# Patient Record
Sex: FEMALE | Race: ASIAN | NOT HISPANIC OR LATINO | URBAN - METROPOLITAN AREA
[De-identification: names, ages, dates, MRNs, and addresses within clinical notes are randomized per-mention and may not be internally consistent; named-entity substitution may affect disease eponyms.]

---

## 2019-08-05 ENCOUNTER — EMERGENCY (EMERGENCY)
Facility: HOSPITAL | Age: 39
LOS: 1 days | Discharge: HOME | End: 2019-08-05
Attending: EMERGENCY MEDICINE | Admitting: EMERGENCY MEDICINE
Payer: SELF-PAY

## 2019-08-05 VITALS
TEMPERATURE: 98 F | SYSTOLIC BLOOD PRESSURE: 116 MMHG | HEART RATE: 74 BPM | RESPIRATION RATE: 18 BRPM | OXYGEN SATURATION: 100 % | WEIGHT: 125 LBS | HEIGHT: 63 IN | DIASTOLIC BLOOD PRESSURE: 74 MMHG

## 2019-08-05 PROCEDURE — 99284 EMERGENCY DEPT VISIT MOD MDM: CPT

## 2019-08-05 PROCEDURE — 99053 MED SERV 10PM-8AM 24 HR FAC: CPT

## 2019-08-06 VITALS
SYSTOLIC BLOOD PRESSURE: 110 MMHG | OXYGEN SATURATION: 98 % | HEART RATE: 70 BPM | RESPIRATION RATE: 16 BRPM | TEMPERATURE: 98 F | DIASTOLIC BLOOD PRESSURE: 70 MMHG

## 2019-08-06 DIAGNOSIS — Z98.891 HISTORY OF UTERINE SCAR FROM PREVIOUS SURGERY: Chronic | ICD-10-CM

## 2019-08-06 LAB
ALBUMIN SERPL ELPH-MCNC: 4.1 G/DL — SIGNIFICANT CHANGE UP (ref 3.3–5)
ALP SERPL-CCNC: 47 U/L — SIGNIFICANT CHANGE UP (ref 40–120)
ALT FLD-CCNC: 14 U/L — SIGNIFICANT CHANGE UP (ref 10–45)
ANION GAP SERPL CALC-SCNC: 10 MMOL/L — SIGNIFICANT CHANGE UP (ref 5–17)
APPEARANCE UR: CLEAR — SIGNIFICANT CHANGE UP
APTT BLD: 34.1 SEC — SIGNIFICANT CHANGE UP (ref 27.5–36.3)
AST SERPL-CCNC: 16 U/L — SIGNIFICANT CHANGE UP (ref 10–40)
BASOPHILS # BLD AUTO: 0.04 K/UL — SIGNIFICANT CHANGE UP (ref 0–0.2)
BASOPHILS NFR BLD AUTO: 0.6 % — SIGNIFICANT CHANGE UP (ref 0–2)
BILIRUB SERPL-MCNC: 0.2 MG/DL — SIGNIFICANT CHANGE UP (ref 0.2–1.2)
BILIRUB UR-MCNC: NEGATIVE — SIGNIFICANT CHANGE UP
BLD GP AB SCN SERPL QL: NEGATIVE — SIGNIFICANT CHANGE UP
BUN SERPL-MCNC: 19 MG/DL — SIGNIFICANT CHANGE UP (ref 7–23)
CALCIUM SERPL-MCNC: 9.1 MG/DL — SIGNIFICANT CHANGE UP (ref 8.4–10.5)
CHLORIDE SERPL-SCNC: 104 MMOL/L — SIGNIFICANT CHANGE UP (ref 96–108)
CO2 SERPL-SCNC: 23 MMOL/L — SIGNIFICANT CHANGE UP (ref 22–31)
COLOR SPEC: YELLOW — SIGNIFICANT CHANGE UP
CREAT SERPL-MCNC: 0.56 MG/DL — SIGNIFICANT CHANGE UP (ref 0.5–1.3)
DIFF PNL FLD: ABNORMAL
EOSINOPHIL # BLD AUTO: 0.22 K/UL — SIGNIFICANT CHANGE UP (ref 0–0.5)
EOSINOPHIL NFR BLD AUTO: 3.3 % — SIGNIFICANT CHANGE UP (ref 0–6)
GLUCOSE SERPL-MCNC: 116 MG/DL — HIGH (ref 70–99)
GLUCOSE UR QL: NEGATIVE — SIGNIFICANT CHANGE UP
HCG SERPL-ACNC: 3581 MIU/ML — HIGH
HCT VFR BLD CALC: 36.2 % — SIGNIFICANT CHANGE UP (ref 34.5–45)
HGB BLD-MCNC: 11.9 G/DL — SIGNIFICANT CHANGE UP (ref 11.5–15.5)
IMM GRANULOCYTES NFR BLD AUTO: 0.1 % — SIGNIFICANT CHANGE UP (ref 0–1.5)
INR BLD: 1.1 — SIGNIFICANT CHANGE UP (ref 0.88–1.16)
KETONES UR-MCNC: NEGATIVE — SIGNIFICANT CHANGE UP
LEUKOCYTE ESTERASE UR-ACNC: NEGATIVE — SIGNIFICANT CHANGE UP
LYMPHOCYTES # BLD AUTO: 2.5 K/UL — SIGNIFICANT CHANGE UP (ref 1–3.3)
LYMPHOCYTES # BLD AUTO: 37.2 % — SIGNIFICANT CHANGE UP (ref 13–44)
MCHC RBC-ENTMCNC: 29.1 PG — SIGNIFICANT CHANGE UP (ref 27–34)
MCHC RBC-ENTMCNC: 32.9 GM/DL — SIGNIFICANT CHANGE UP (ref 32–36)
MCV RBC AUTO: 88.5 FL — SIGNIFICANT CHANGE UP (ref 80–100)
MONOCYTES # BLD AUTO: 0.45 K/UL — SIGNIFICANT CHANGE UP (ref 0–0.9)
MONOCYTES NFR BLD AUTO: 6.7 % — SIGNIFICANT CHANGE UP (ref 2–14)
NEUTROPHILS # BLD AUTO: 3.5 K/UL — SIGNIFICANT CHANGE UP (ref 1.8–7.4)
NEUTROPHILS NFR BLD AUTO: 52.1 % — SIGNIFICANT CHANGE UP (ref 43–77)
NITRITE UR-MCNC: NEGATIVE — SIGNIFICANT CHANGE UP
NRBC # BLD: 0 /100 WBCS — SIGNIFICANT CHANGE UP (ref 0–0)
PH UR: 7 — SIGNIFICANT CHANGE UP (ref 5–8)
PLATELET # BLD AUTO: 313 K/UL — SIGNIFICANT CHANGE UP (ref 150–400)
POTASSIUM SERPL-MCNC: 3.7 MMOL/L — SIGNIFICANT CHANGE UP (ref 3.5–5.3)
POTASSIUM SERPL-SCNC: 3.7 MMOL/L — SIGNIFICANT CHANGE UP (ref 3.5–5.3)
PROT SERPL-MCNC: 7.3 G/DL — SIGNIFICANT CHANGE UP (ref 6–8.3)
PROT UR-MCNC: NEGATIVE MG/DL — SIGNIFICANT CHANGE UP
PROTHROM AB SERPL-ACNC: 12.5 SEC — SIGNIFICANT CHANGE UP (ref 10–12.9)
RBC # BLD: 4.09 M/UL — SIGNIFICANT CHANGE UP (ref 3.8–5.2)
RBC # FLD: 13.3 % — SIGNIFICANT CHANGE UP (ref 10.3–14.5)
RH IG SCN BLD-IMP: POSITIVE — SIGNIFICANT CHANGE UP
SODIUM SERPL-SCNC: 137 MMOL/L — SIGNIFICANT CHANGE UP (ref 135–145)
SP GR SPEC: 1.01 — SIGNIFICANT CHANGE UP (ref 1–1.03)
UROBILINOGEN FLD QL: 0.2 E.U./DL — SIGNIFICANT CHANGE UP
WBC # BLD: 6.72 K/UL — SIGNIFICANT CHANGE UP (ref 3.8–10.5)
WBC # FLD AUTO: 6.72 K/UL — SIGNIFICANT CHANGE UP (ref 3.8–10.5)

## 2019-08-06 PROCEDURE — 86900 BLOOD TYPING SEROLOGIC ABO: CPT

## 2019-08-06 PROCEDURE — 84702 CHORIONIC GONADOTROPIN TEST: CPT

## 2019-08-06 PROCEDURE — 86850 RBC ANTIBODY SCREEN: CPT

## 2019-08-06 PROCEDURE — 76801 OB US < 14 WKS SINGLE FETUS: CPT | Mod: 26

## 2019-08-06 PROCEDURE — 81001 URINALYSIS AUTO W/SCOPE: CPT

## 2019-08-06 PROCEDURE — 76801 OB US < 14 WKS SINGLE FETUS: CPT

## 2019-08-06 PROCEDURE — 99284 EMERGENCY DEPT VISIT MOD MDM: CPT

## 2019-08-06 PROCEDURE — 36415 COLL VENOUS BLD VENIPUNCTURE: CPT

## 2019-08-06 PROCEDURE — 85730 THROMBOPLASTIN TIME PARTIAL: CPT

## 2019-08-06 PROCEDURE — 85610 PROTHROMBIN TIME: CPT

## 2019-08-06 PROCEDURE — 76817 TRANSVAGINAL US OBSTETRIC: CPT

## 2019-08-06 PROCEDURE — 86901 BLOOD TYPING SEROLOGIC RH(D): CPT

## 2019-08-06 PROCEDURE — 80053 COMPREHEN METABOLIC PANEL: CPT

## 2019-08-06 PROCEDURE — 76817 TRANSVAGINAL US OBSTETRIC: CPT | Mod: 26

## 2019-08-06 PROCEDURE — 85025 COMPLETE CBC W/AUTO DIFF WBC: CPT

## 2019-08-06 NOTE — ED ADULT NURSE NOTE - OBJECTIVE STATEMENT
Patient came in c/o vaginal bleeding. patient currently 11 weeks pregnant (). Denies cramping, any strenuous exercise prior to vaginal bleeding episode. Vaginal bleeding started at approx 12am, reports bigger than quarter sized bleed to underwear. No prior medical hx with pregnancies.

## 2019-08-06 NOTE — ED PROVIDER NOTE - CARE PLAN
Principal Discharge DX:	Vaginal bleeding in pregnancy Principal Discharge DX:	Vaginal bleeding in pregnancy  Secondary Diagnosis:	Miscarriage

## 2019-08-06 NOTE — ED PROVIDER NOTE - OBJECTIVE STATEMENT
38F , 11wks pregnant c/o lower abd cramping tonight.  +vaginal bleeding, no clots. no vomiting, no fevers. no trauma. no dysuria. states had US 3 wks ago and was told everything was in correct position.

## 2019-08-06 NOTE — ED ADULT NURSE REASSESSMENT NOTE - NS ED NURSE REASSESS COMMENT FT1
Patient ambulated to examination room independently accompanied by . No c/o voiced at this time. S/p ultrasound, pending GYN consult. Continue to monitor.

## 2019-08-06 NOTE — ED ADULT NURSE NOTE - CHPI ED NUR SYMPTOMS NEG
no nausea/no pain/no discharge/no back pain/no vaginal discharge/no coffee grounds emesis/no vomiting/no fever/no abdominal pain

## 2019-08-06 NOTE — ED PROVIDER NOTE - NSFOLLOWUPINSTRUCTIONS_ED_ALL_ED_FT
Miscarriage    WHAT YOU NEED TO KNOW:    What is a miscarriage? A miscarriage is the loss of a fetus before 20 weeks of pregnancy. A miscarriage may also be called a spontaneous  or an early pregnancy loss.     What causes a miscarriage? The cause of your miscarriage may not be known. The following may increase the risk:     Being 35 years or older      Genetic problems in the fetus      Poorly controlled diabetes, high blood pressure, or thyroid disease      An infection such as toxoplasmosis or syphilis      Drinking alcohol or caffeine, smoking, or using drugs during pregnancy      Being overweight or underweight      Problems with the uterus, placenta, or cervix    What are the signs and symptoms of a miscarriage? You may not have symptoms of a miscarriage or you may have any of the following:     Vaginal spotting or heavy bleeding      Pain or cramping in your abdomen or lower back      Discharge of bloody fluid, tissue, or blood clots from your vagina      Nausea or vomiting      Dizziness    How is a miscarriage treated? You may not need treatment for a miscarriage. You may need any of the following if you have heavy bleeding or tissue left in your uterus after the miscarriage:     Medicine may be given to control bleeding and prevent infection. Medicine may also be given to control pain and prevent complications in future pregnancies.       Surgery may be needed to remove the tissue left in your uterus. Surgery may include a dilation and curettage (D&C) or a dilation and evacuation (D&E). Surgery may also be needed to control bleeding or prevent an infection.     How can I care for myself after a miscarriage?     Do not put anything in your vagina for 2 weeks or as directed. Do not use tampons, douche, or have sex. These actions can cause infection and pain.       Use sanitary pads as needed. You may have light bleeding or spotting for 2 weeks.       Do not take a bath or go swimming for 2 weeks or as directed. These actions may increase your risk for an infection. Take showers only.       Rest as needed. Slowly start to do more each day. Return to your daily activities as directed.       Talk to your healthcare provider about birth control. If you would like to prevent another pregnancy, ask your healthcare provider which type of birth control is best for you.       Join a support group or therapy to help you cope. A miscarriage may be very difficult for you, your partner, and other members of your family. There is no right way to feel after a miscarriage. You may feel overwhelming grief or other emotions. It may be helpful to talk to a friend, family member, or counselor about your feelings. You may worry that you could have another miscarriage. Talk to your healthcare provider about your concerns. He may be able to help you reduce the risk for another miscarriage. He may also help you find ways to cope with grief.     Where can I find more information?     The [employer]  [address] [address]  [place] [address]  Phone: 1-589.745.2878  Phone: 1-344.884.3992  Web Address: http://www.acog.org      [facility]  [address] [place] [address]  Web Address: http://www.Talkdesk      When should I seek immediate care?     You have foul-smelling drainage or pus coming from your vagina.      You have heavy vaginal bleeding and soak 1 pad or more in an hour.       You have severe abdominal pain.      You feel like your heart is beating faster than normal.       You feel extremely weak or dizzy.     When should I contact my healthcare provider?     You have a fever greater than 100.4°F or chills.       You have extreme sadness, grief, or feel unable to cope with what has happened.       You have questions or concerns about your condition or care.    CARE AGREEMENT:    You have the right to help plan your care. Learn about your health condition and how it may be treated. Discuss treatment options with your healthcare providers to decide what care you want to receive. You always have the right to refuse treatment.

## 2019-08-06 NOTE — ED PROVIDER NOTE - CARE PROVIDER_API CALL
Iris Johnson)  Obstetrics and Gynecology  [address] [place] [address]  Phone: [phone]  Fax: [phone]  Follow Up Time: 1-3 Days

## 2019-08-06 NOTE — CONSULT NOTE ADULT - SUBJECTIVE AND OBJECTIVE BOX
Patient is a 38 year old  at 10w5d by LMP  presenting with vaginal bleeding and cramping that started tonight. Bleeding has not been substantial, has only used 1 pad. Cramping is mild.  Lives in [facility] and has an OB there who she follows with. Had a sono with her OB at [place] who she sees when she is in [facility] around 8 weeks that showed an FH and was told everything looked normal. Patient said she felt nauseous at the beginning of the pregnancy but this stopped about 2 weeks ago. Denies CP, SOB, dizziness, dysuria, vaginal discharge.     POB: G1 - VTOP   G2 - c/s    G3 - c/s   G4 - current  PGYN: denies  PMH: anemia, hypothyroid  PSH: c/s x2   Meds: synthroid 100 mcg  NKDA     Vital Signs Last 24 Hrs  T(C): 36.8 (06 Aug 2019 02:21), Max: 36.8 (06 Aug 2019 02:21)  T(F): 98.2 (06 Aug 2019 02:21), Max: 98.2 (06 Aug 2019 02:21)  HR: 69 (06 Aug 2019 02:21) (69 - 74)  BP: 103/68 (06 Aug 2019 02:21) (103/68 - 116/74)  BP(mean): --  RR: 16 (06 Aug 2019 02:21) (16 - 18)  SpO2: 97% (06 Aug 2019 02:21) (97% - 100%)    PE   Gen NAD   Abd soft, NT/ND   Pelvic small amount dark red blood in vault <10cc, cervix closed, no POC or discharge seen, no active bleeding, no CMT, no adnexal tenderness or fullness, no fundal tenderness                           11.9   6.72  )-----------( 313      ( 06 Aug 2019 00:35 )             36.2     08-    137  |  104  |  19  ----------------------------<  116<H>  3.7   |  23  |  0.56    Ca    9.1      06 Aug 2019 00:37    TPro  7.3  /  Alb  4.1  /  TBili  0.2  /  DBili  x   /  AST  16  /  ALT  14  /  AlkPhos  47  08-      Urinalysis Basic - ( 06 Aug 2019 00:42 )    Color: Yellow / Appearance: Clear / S.015 / pH: x  Gluc: x / Ketone: NEGATIVE  / Bili: Negative / Urobili: 0.2 E.U./dL   Blood: x / Protein: NEGATIVE mg/dL / Nitrite: NEGATIVE   Leuk Esterase: NEGATIVE / RBC: > 10 /HPF / WBC < 5 /HPF   Sq Epi: x / Non Sq Epi: 5-10 /HPF / Bacteria: None /HPF      < from: US Transvaginal, OB (19 @ 01:52) >  IMPRESSION:   Single intrauterine gestation is visible with a mean sac diameter of 3.6   cm and no fetal heart rate. This is consistent with a failed early   pregnancy.    Findings were discussed with Dr. Mejía at 2:10 a.m. on 2019    Thank you for the opportunity to participate in the care of this patient.      PRECIOUS QUEEN M.D., RADIOLOGY RESIDENT    < end of copied text >

## 2019-08-06 NOTE — CONSULT NOTE ADULT - ASSESSMENT
A/P: Patient is a 38 year old  at 10w5d by LMP  presenting with vaginal bleeding and cramping, found to have missed Ab with CRL measuring ~7-8 weeks     -patient is hemodynamically stable with no signs of infection, hemorrhage or anemia  -counseled patient on management options for 1st trimester pregnancy loss including expectant, medication, and surgical management  -pros and cons discussed with patient, patient is leaning towards medical management with mifepristone and misoprostol because she would like to avoid surgery   -she would like to think about it tonight and then call her OB in [facility] and discuss with them   -patient told to return to ED with fever >100.4 F, severe abdominal pain, heavy vaginal bleeding, dizziness/lightheadedness   -pt told to follow up with her obgyn at [place] in 2-3 days     D/w  Or

## 2019-08-07 ENCOUNTER — INPATIENT (INPATIENT)
Facility: HOSPITAL | Age: 39
LOS: 0 days | Discharge: HOME | DRG: 770 | End: 2019-08-07
Attending: GENERAL ACUTE CARE HOSPITAL | Admitting: GENERAL ACUTE CARE HOSPITAL
Payer: SELF-PAY

## 2019-08-07 VITALS
DIASTOLIC BLOOD PRESSURE: 55 MMHG | HEART RATE: 55 BPM | OXYGEN SATURATION: 100 % | SYSTOLIC BLOOD PRESSURE: 95 MMHG | RESPIRATION RATE: 18 BRPM | TEMPERATURE: 97 F

## 2019-08-07 VITALS
HEART RATE: 64 BPM | OXYGEN SATURATION: 100 % | SYSTOLIC BLOOD PRESSURE: 98 MMHG | RESPIRATION RATE: 18 BRPM | HEIGHT: 63 IN | WEIGHT: 119.93 LBS | TEMPERATURE: 99 F | DIASTOLIC BLOOD PRESSURE: 63 MMHG

## 2019-08-07 DIAGNOSIS — Z98.891 HISTORY OF UTERINE SCAR FROM PREVIOUS SURGERY: Chronic | ICD-10-CM

## 2019-08-07 PROBLEM — E03.9 HYPOTHYROIDISM, UNSPECIFIED: Chronic | Status: ACTIVE | Noted: 2019-08-06

## 2019-08-07 LAB
ALBUMIN SERPL ELPH-MCNC: 3.3 G/DL — SIGNIFICANT CHANGE UP (ref 3.3–5)
ALP SERPL-CCNC: 33 U/L — LOW (ref 40–120)
ALT FLD-CCNC: 9 U/L — LOW (ref 10–45)
ANION GAP SERPL CALC-SCNC: 8 MMOL/L — SIGNIFICANT CHANGE UP (ref 5–17)
APTT BLD: 27.7 SEC — SIGNIFICANT CHANGE UP (ref 27.5–36.3)
AST SERPL-CCNC: 12 U/L — SIGNIFICANT CHANGE UP (ref 10–40)
BASOPHILS # BLD AUTO: 0.02 K/UL — SIGNIFICANT CHANGE UP (ref 0–0.2)
BASOPHILS # BLD AUTO: 0.04 K/UL — SIGNIFICANT CHANGE UP (ref 0–0.2)
BASOPHILS NFR BLD AUTO: 0.2 % — SIGNIFICANT CHANGE UP (ref 0–2)
BASOPHILS NFR BLD AUTO: 0.4 % — SIGNIFICANT CHANGE UP (ref 0–2)
BILIRUB SERPL-MCNC: 0.4 MG/DL — SIGNIFICANT CHANGE UP (ref 0.2–1.2)
BUN SERPL-MCNC: 17 MG/DL — SIGNIFICANT CHANGE UP (ref 7–23)
CALCIUM SERPL-MCNC: 8 MG/DL — LOW (ref 8.4–10.5)
CHLORIDE SERPL-SCNC: 109 MMOL/L — HIGH (ref 96–108)
CO2 SERPL-SCNC: 21 MMOL/L — LOW (ref 22–31)
CREAT SERPL-MCNC: 0.59 MG/DL — SIGNIFICANT CHANGE UP (ref 0.5–1.3)
EOSINOPHIL # BLD AUTO: 0.03 K/UL — SIGNIFICANT CHANGE UP (ref 0–0.5)
EOSINOPHIL # BLD AUTO: 0.05 K/UL — SIGNIFICANT CHANGE UP (ref 0–0.5)
EOSINOPHIL NFR BLD AUTO: 0.3 % — SIGNIFICANT CHANGE UP (ref 0–6)
EOSINOPHIL NFR BLD AUTO: 0.5 % — SIGNIFICANT CHANGE UP (ref 0–6)
GLUCOSE SERPL-MCNC: 103 MG/DL — HIGH (ref 70–99)
HCG SERPL-ACNC: 1613 MIU/ML — HIGH
HCT VFR BLD CALC: 22.4 % — LOW (ref 34.5–45)
HCT VFR BLD CALC: 25.4 % — LOW (ref 34.5–45)
HCT VFR BLD CALC: 27 % — LOW (ref 34.5–45)
HGB BLD-MCNC: 7.4 G/DL — LOW (ref 11.5–15.5)
HGB BLD-MCNC: 8.2 G/DL — LOW (ref 11.5–15.5)
HGB BLD-MCNC: 8.6 G/DL — LOW (ref 11.5–15.5)
IMM GRANULOCYTES NFR BLD AUTO: 0.4 % — SIGNIFICANT CHANGE UP (ref 0–1.5)
IMM GRANULOCYTES NFR BLD AUTO: 0.4 % — SIGNIFICANT CHANGE UP (ref 0–1.5)
INR BLD: 1.34 — HIGH (ref 0.88–1.16)
LYMPHOCYTES # BLD AUTO: 1.34 K/UL — SIGNIFICANT CHANGE UP (ref 1–3.3)
LYMPHOCYTES # BLD AUTO: 1.85 K/UL — SIGNIFICANT CHANGE UP (ref 1–3.3)
LYMPHOCYTES # BLD AUTO: 13.6 % — SIGNIFICANT CHANGE UP (ref 13–44)
LYMPHOCYTES # BLD AUTO: 15.5 % — SIGNIFICANT CHANGE UP (ref 13–44)
MCHC RBC-ENTMCNC: 28.9 PG — SIGNIFICANT CHANGE UP (ref 27–34)
MCHC RBC-ENTMCNC: 29.3 PG — SIGNIFICANT CHANGE UP (ref 27–34)
MCHC RBC-ENTMCNC: 29.5 PG — SIGNIFICANT CHANGE UP (ref 27–34)
MCHC RBC-ENTMCNC: 31.9 GM/DL — LOW (ref 32–36)
MCHC RBC-ENTMCNC: 32.3 GM/DL — SIGNIFICANT CHANGE UP (ref 32–36)
MCHC RBC-ENTMCNC: 33 GM/DL — SIGNIFICANT CHANGE UP (ref 32–36)
MCV RBC AUTO: 89.2 FL — SIGNIFICANT CHANGE UP (ref 80–100)
MCV RBC AUTO: 89.4 FL — SIGNIFICANT CHANGE UP (ref 80–100)
MCV RBC AUTO: 91.8 FL — SIGNIFICANT CHANGE UP (ref 80–100)
MONOCYTES # BLD AUTO: 0.21 K/UL — SIGNIFICANT CHANGE UP (ref 0–0.9)
MONOCYTES # BLD AUTO: 0.37 K/UL — SIGNIFICANT CHANGE UP (ref 0–0.9)
MONOCYTES NFR BLD AUTO: 2.1 % — SIGNIFICANT CHANGE UP (ref 2–14)
MONOCYTES NFR BLD AUTO: 3.1 % — SIGNIFICANT CHANGE UP (ref 2–14)
NEUTROPHILS # BLD AUTO: 8.18 K/UL — HIGH (ref 1.8–7.4)
NEUTROPHILS # BLD AUTO: 9.59 K/UL — HIGH (ref 1.8–7.4)
NEUTROPHILS NFR BLD AUTO: 80.5 % — HIGH (ref 43–77)
NEUTROPHILS NFR BLD AUTO: 83 % — HIGH (ref 43–77)
NRBC # BLD: 0 /100 WBCS — SIGNIFICANT CHANGE UP (ref 0–0)
PLATELET # BLD AUTO: 200 K/UL — SIGNIFICANT CHANGE UP (ref 150–400)
PLATELET # BLD AUTO: 221 K/UL — SIGNIFICANT CHANGE UP (ref 150–400)
PLATELET # BLD AUTO: 250 K/UL — SIGNIFICANT CHANGE UP (ref 150–400)
POTASSIUM SERPL-MCNC: 3.5 MMOL/L — SIGNIFICANT CHANGE UP (ref 3.5–5.3)
POTASSIUM SERPL-SCNC: 3.5 MMOL/L — SIGNIFICANT CHANGE UP (ref 3.5–5.3)
PROT SERPL-MCNC: 5.7 G/DL — LOW (ref 6–8.3)
PROTHROM AB SERPL-ACNC: 15.3 SEC — HIGH (ref 10–12.9)
RBC # BLD: 2.51 M/UL — LOW (ref 3.8–5.2)
RBC # BLD: 2.84 M/UL — LOW (ref 3.8–5.2)
RBC # BLD: 2.94 M/UL — LOW (ref 3.8–5.2)
RBC # FLD: 13.2 % — SIGNIFICANT CHANGE UP (ref 10.3–14.5)
RBC # FLD: 13.3 % — SIGNIFICANT CHANGE UP (ref 10.3–14.5)
RBC # FLD: 13.4 % — SIGNIFICANT CHANGE UP (ref 10.3–14.5)
SODIUM SERPL-SCNC: 138 MMOL/L — SIGNIFICANT CHANGE UP (ref 135–145)
WBC # BLD: 11.91 K/UL — HIGH (ref 3.8–10.5)
WBC # BLD: 9.86 K/UL — SIGNIFICANT CHANGE UP (ref 3.8–10.5)
WBC # BLD: 9.96 K/UL — SIGNIFICANT CHANGE UP (ref 3.8–10.5)
WBC # FLD AUTO: 11.91 K/UL — HIGH (ref 3.8–10.5)
WBC # FLD AUTO: 9.86 K/UL — SIGNIFICANT CHANGE UP (ref 3.8–10.5)
WBC # FLD AUTO: 9.96 K/UL — SIGNIFICANT CHANGE UP (ref 3.8–10.5)

## 2019-08-07 PROCEDURE — 59812 TREATMENT OF MISCARRIAGE: CPT

## 2019-08-07 PROCEDURE — 99285 EMERGENCY DEPT VISIT HI MDM: CPT

## 2019-08-07 PROCEDURE — 99053 MED SERV 10PM-8AM 24 HR FAC: CPT

## 2019-08-07 PROCEDURE — 76801 OB US < 14 WKS SINGLE FETUS: CPT | Mod: 26

## 2019-08-07 PROCEDURE — 76817 TRANSVAGINAL US OBSTETRIC: CPT | Mod: 26

## 2019-08-07 RX ORDER — HYDROMORPHONE HYDROCHLORIDE 2 MG/ML
0.5 INJECTION INTRAMUSCULAR; INTRAVENOUS; SUBCUTANEOUS
Refills: 0 | Status: DISCONTINUED | OUTPATIENT
Start: 2019-08-07 | End: 2019-08-07

## 2019-08-07 RX ORDER — SODIUM CHLORIDE 9 MG/ML
1000 INJECTION INTRAMUSCULAR; INTRAVENOUS; SUBCUTANEOUS ONCE
Refills: 0 | Status: COMPLETED | OUTPATIENT
Start: 2019-08-07 | End: 2019-08-07

## 2019-08-07 RX ORDER — DIPHENHYDRAMINE HCL 50 MG
25 CAPSULE ORAL ONCE
Refills: 0 | Status: COMPLETED | OUTPATIENT
Start: 2019-08-07 | End: 2019-08-07

## 2019-08-07 RX ORDER — ONDANSETRON 8 MG/1
4 TABLET, FILM COATED ORAL ONCE
Refills: 0 | Status: DISCONTINUED | OUTPATIENT
Start: 2019-08-07 | End: 2019-08-07

## 2019-08-07 RX ORDER — METOCLOPRAMIDE HCL 10 MG
10 TABLET ORAL EVERY 6 HOURS
Refills: 0 | Status: DISCONTINUED | OUTPATIENT
Start: 2019-08-07 | End: 2019-08-07

## 2019-08-07 RX ORDER — KETOROLAC TROMETHAMINE 30 MG/ML
30 SYRINGE (ML) INJECTION ONCE
Refills: 0 | Status: DISCONTINUED | OUTPATIENT
Start: 2019-08-07 | End: 2019-08-07

## 2019-08-07 RX ORDER — SODIUM CHLORIDE 9 MG/ML
1000 INJECTION, SOLUTION INTRAVENOUS
Refills: 0 | Status: DISCONTINUED | OUTPATIENT
Start: 2019-08-07 | End: 2019-08-07

## 2019-08-07 RX ADMIN — SODIUM CHLORIDE 1500 MILLILITER(S): 9 INJECTION INTRAMUSCULAR; INTRAVENOUS; SUBCUTANEOUS at 09:17

## 2019-08-07 RX ADMIN — SODIUM CHLORIDE 1000 MILLILITER(S): 9 INJECTION INTRAMUSCULAR; INTRAVENOUS; SUBCUTANEOUS at 09:16

## 2019-08-07 RX ADMIN — SODIUM CHLORIDE 1500 MILLILITER(S): 9 INJECTION INTRAMUSCULAR; INTRAVENOUS; SUBCUTANEOUS at 08:52

## 2019-08-07 RX ADMIN — Medication 30 MILLIGRAM(S): at 09:08

## 2019-08-07 RX ADMIN — SODIUM CHLORIDE 1000 MILLILITER(S): 9 INJECTION INTRAMUSCULAR; INTRAVENOUS; SUBCUTANEOUS at 10:45

## 2019-08-07 RX ADMIN — Medication 30 MILLIGRAM(S): at 09:38

## 2019-08-07 NOTE — H&P ADULT - NSHPPHYSICALEXAM_GEN_ALL_CORE
PHYSICAL EXAM:   Vital Signs Last 24 Hrs  T(C): 37 (07 Aug 2019 13:41), Max: 37.3 (07 Aug 2019 12:59)  T(F): 98.6 (07 Aug 2019 13:41), Max: 99.1 (07 Aug 2019 12:59)  HR: 64 (07 Aug 2019 13:41) (64 - 81)  BP: 94/54 (07 Aug 2019 13:41) (94/54 - 110/59)  BP(mean): --  RR: 16 (07 Aug 2019 13:41) (16 - 18)  SpO2: 99% (07 Aug 2019 13:32) (99% - 100%)    **************************  Constitutional: Alert & Oriented x3, No acute distress  Respiratory: Clear to ausculation bilaterally; no wheezing, rhonchi, or crackles  Cardiovascular: regular rate and rhythm, no murmurs, or gallops  Gastrointestinal: soft, non tender, positive bowel sounds, no rebound or guarding   Pelvic exam:   Extremities: no calf tenderness or swelling

## 2019-08-07 NOTE — BRIEF OPERATIVE NOTE - NSICDXBRIEFPROCEDURE_GEN_ALL_CORE_FT
PROCEDURES:  Dilation and curettage of uterus using suction for incomplete  07-Aug-2019 15:01:44  Altagracia Medina

## 2019-08-07 NOTE — H&P ADULT - HISTORY OF PRESENT ILLNESS
38 year old  at 10w6d by LMP  presents for repeat evaluation with ongoing vaginal bleeding and cramping. Patient was evaluated yesterday in both the [facility] ED and in the ED at [facility] for vaginal bleeding in the setting of an incomplete . Patient was supposed to have surgery performed at [facility] yesterday but reports that the surgery was cancelled. This morning she noted heavy bleeding with passage of clots and she reports a vasovagal episode at home which prompted her to call the ambulance. Since arriving to the ED, her bleeding has been intermittent with cramping. She endorses lightheadedness, dizziness but denies CP, SOB, dysuria, vaginal discharge.     POB: G1 - VTOP D&c   G2 - c/s    G3 - c/s 2017  G4 - current  PGYN: denies  PMH: anemia, hypothyroid  PSH: c/s x2   Meds: synthroid 100 mcg  NKDA

## 2019-08-07 NOTE — H&P ADULT - NSHPLABSRESULTS_GEN_ALL_CORE
LABS:                        7.4    11.91 )-----------( 221      ( 07 Aug 2019 13:46 )             22.4         138  |  109<H>  |  17  ----------------------------<  103<H>  3.5   |  21<L>  |  0.59    Ca    8.0<L>      07 Aug 2019 08:45    TPro  5.7<L>  /  Alb  3.3  /  TBili  0.4  /  DBili  x   /  AST  12  /  ALT  9<L>  /  AlkPhos  33<L>      PT/INR - ( 07 Aug 2019 13:46 )   PT: 15.3 sec;   INR: 1.34          PTT - ( 07 Aug 2019 13:46 )  PTT:27.7 sec  Urinalysis Basic - ( 06 Aug 2019 00:42 )    Color: Yellow / Appearance: Clear / S.015 / pH: x  Gluc: x / Ketone: NEGATIVE  / Bili: Negative / Urobili: 0.2 E.U./dL   Blood: x / Protein: NEGATIVE mg/dL / Nitrite: NEGATIVE   Leuk Esterase: NEGATIVE / RBC: > 10 /HPF / WBC < 5 /HPF   Sq Epi: x / Non Sq Epi: 5-10 /HPF / Bacteria: None /HPF      HCG Quantitative, Serum: 1613 mIU/mL ( @ 10:05)      RADIOLOGY & ADDITIONAL STUDIES:    EXAM:  US OB TRANSVAGINAL                        EXAM:  US OB LES THAN 14 WKS 1ST GEST                          PROCEDURE DATE:  2019      INTERPRETATION:  OBSTETRICAL ULTRASOUND - FIRST TRIMESTER dated 2019   11:31 AM    INDICATION: First trimester pregnancy with vaginal bleeding. Retained   products of conception. LMP: 2019    beta-hC    TECHNIQUE: Transabdominal views of the pelvis were obtained followed by   transvaginal views for better visualization of the endometrial cavity.      PRIOR STUDIES: Pelvic ultrasound 2019.    FINDINGS: By dates, the estimated gestational age is 11 weeks and one day.    There is again an intrauterine, elongated gestational sac with a mean sac   diameter of 4.0 cm, corresponding to estimated gestational age of nine   weeks and five days. Crown-rump length is 7.8 mm, corresponding to   estimated gestational age of six weeks and six days. Average ultrasound   age is eight weeks and two days. No fetal heart beat is identified.   Findings are again consistent with fetal demise. Small calcified yolk sac   present.    The uterus is anteverted. The uterus is 12.8 x 6.2 x 6.1 cm. No   myometrial abnormalities are seen. Cervical length is 3.7 cm.    The right ovary is normal in size, measuring 2.8 x 1.9 x 1.6 cm. No right   ovarian masses are seen. The left ovary is normal in size, measuring 2.4   x 2.3 x 0.9 cm. No left ovarian masses are seen. Doppler evaluation   demonstrates flow to both ovaries with no evidence of torsion.    Mild amount of free fluid pelvis.    IMPRESSION:   As on study of 2019, the findings are consistent with pregnancy   failure.    Thank you for the opportunity to participate in the care of this patient.    COCA FRANCISCO J CASALDUC M.D., RADIOLOGY RESIDENT  This document has been electronically signed.  BALJIT SLATER M.D., ATTENDING RADIOLOGIST  This document has been electronically signed. Aug  7 2019  1:13PM

## 2019-08-07 NOTE — ED PROVIDER NOTE - ATTENDING CONTRIBUTION TO CARE
here w/ concern for incomplete miscarraige, with downtrending hcg and hemoglobin/hematocrit. pt non toxic appearing but hypotensive, abdominal exam not peritoneal, ambulating around ED without issue. rpt US shows persistent POCs, GYN consulted for need for surgical management. Went to OR from ED.

## 2019-08-07 NOTE — ED PROVIDER NOTE - OBJECTIVE STATEMENT
39 y/o F with Hx of miscarriage on Monday @ 11 weeks gestation, , seen and evaluated Monday night and discharged to followup with OB/GYN. Presents today with heavy vaginal bleeding for 2 hours this morning. Reports feeling dizzy, weak, and lower abdomen cramping. Denies fevers, chills, SOB, chest pain, urinary symptoms, or any other acute complaints. Reports that she was seen by OB yesterday, repeat US showed significant amount of products of conception, determined would need D&C. Discharged to schedule D&C outpatient. Patient presented to ED hypotensive and symptomatic. 37 y/o F with Hx of miscarriage on Monday @ 11 weeks gestation, , was  evaluated Monday night and discharged to followup with OB/GYN. Presents today with heavy vaginal bleeding for 2 hours ago  this morning. Reports feeling dizzy, weak, and lower abdomen cramping. Denies fevers, chills, SOB, chest pain, urinary symptoms, or any other acute complaints. Reports that she was seen by her OB yesterday but was having heavy bleeding and was sent to ED at [facility].  Sonogram was repeated and showed significant amount of products of conception, determined would need D&C. However bleeding stop  was discharge and told to schedule outpt appt.  Discharged to schedule D&C outpatient. Patient presented to ED hypotensive and symptomatic. Denies heavy bleeding at this time.

## 2019-08-07 NOTE — ED PROVIDER NOTE - CLINICAL SUMMARY MEDICAL DECISION MAKING FREE TEXT BOX
Patient with retained POC s/p miscarriage on Monday with intermittent burst of heavy vaginal bleeding. Presented to ED hypotensive with a drop on hemoglobulin in two days. B/p improving with IVF  and sonogram was done showing POC and beta decreased by half. Gyn was consulted saw and examine patient.  Patient admitted for  D&C.

## 2019-08-07 NOTE — BRIEF OPERATIVE NOTE - OPERATION/FINDINGS
Given heavy vaginal bleeding from incomplete  preoperatively with decrease in Hgb to 7.4 and hypotensive, patient was given one unit of pRBC in OR. Approx 10 week size uterus felt on exam, actively bleeding. Cervix appeared normal, dilated to #18 dilator. Suction curettage performed.  Sharp curettage followed. All POC removed. POC sent to pathology for evaluation.   EBL 100cc Given heavy vaginal bleeding from incomplete  preoperatively with decrease in Hgb to 7.4 and hypotensive, patient was given one unit of pRBC in OR. Approx 10 week size uterus felt on exam, actively bleeding. Cervix appeared normal, dilated to #18 dilator. Suction curettage performed.  Sharp curettage followed. All POC removed. POC sent to pathology for evaluation.  Scant bleeding per os at end of procedure  EBL 100cc

## 2019-08-07 NOTE — H&P ADULT - ASSESSMENT
38 year old  at 10w6d by LMP  presents with incomplete  with persistent vaginal bleeding to a Hb of 7.2.   - Patient counseled regarding surgical management.   - NPO, IV fluids  - Admit for procedure.   - Consent obtained     [facility] [place] [facility] [place]

## 2019-08-08 PROCEDURE — 85730 THROMBOPLASTIN TIME PARTIAL: CPT

## 2019-08-08 PROCEDURE — 84702 CHORIONIC GONADOTROPIN TEST: CPT

## 2019-08-08 PROCEDURE — 76817 TRANSVAGINAL US OBSTETRIC: CPT

## 2019-08-08 PROCEDURE — P9016: CPT

## 2019-08-08 PROCEDURE — 86901 BLOOD TYPING SEROLOGIC RH(D): CPT

## 2019-08-08 PROCEDURE — 36430 TRANSFUSION BLD/BLD COMPNT: CPT

## 2019-08-08 PROCEDURE — 85025 COMPLETE CBC W/AUTO DIFF WBC: CPT

## 2019-08-08 PROCEDURE — 85610 PROTHROMBIN TIME: CPT

## 2019-08-08 PROCEDURE — 86900 BLOOD TYPING SEROLOGIC ABO: CPT

## 2019-08-08 PROCEDURE — 85027 COMPLETE CBC AUTOMATED: CPT

## 2019-08-08 PROCEDURE — 80053 COMPREHEN METABOLIC PANEL: CPT

## 2019-08-08 PROCEDURE — 86923 COMPATIBILITY TEST ELECTRIC: CPT

## 2019-08-08 PROCEDURE — 36415 COLL VENOUS BLD VENIPUNCTURE: CPT

## 2019-08-08 PROCEDURE — 86850 RBC ANTIBODY SCREEN: CPT

## 2019-08-08 PROCEDURE — 99285 EMERGENCY DEPT VISIT HI MDM: CPT | Mod: 25

## 2019-08-08 PROCEDURE — 96374 THER/PROPH/DIAG INJ IV PUSH: CPT

## 2019-08-08 PROCEDURE — 76801 OB US < 14 WKS SINGLE FETUS: CPT

## 2019-08-08 PROCEDURE — 88305 TISSUE EXAM BY PATHOLOGIST: CPT

## 2019-08-08 PROCEDURE — 96361 HYDRATE IV INFUSION ADD-ON: CPT

## 2019-08-09 LAB — SURGICAL PATHOLOGY STUDY: SIGNIFICANT CHANGE UP

## 2019-08-10 DIAGNOSIS — R10.30 LOWER ABDOMINAL PAIN, UNSPECIFIED: ICD-10-CM

## 2019-08-10 DIAGNOSIS — O03.9 COMPLETE OR UNSPECIFIED SPONTANEOUS ABORTION WITHOUT COMPLICATION: ICD-10-CM

## 2019-08-12 DIAGNOSIS — O03.4 INCOMPLETE SPONTANEOUS ABORTION WITHOUT COMPLICATION: ICD-10-CM

## 2019-08-12 DIAGNOSIS — O99.411 DISEASES OF THE CIRCULATORY SYSTEM COMPLICATING PREGNANCY, FIRST TRIMESTER: ICD-10-CM

## 2019-08-12 DIAGNOSIS — Z3A.10 10 WEEKS GESTATION OF PREGNANCY: ICD-10-CM

## 2019-08-12 DIAGNOSIS — O99.281 ENDOCRINE, NUTRITIONAL AND METABOLIC DISEASES COMPLICATING PREGNANCY, FIRST TRIMESTER: ICD-10-CM

## 2019-08-12 DIAGNOSIS — E03.9 HYPOTHYROIDISM, UNSPECIFIED: ICD-10-CM

## 2019-08-12 DIAGNOSIS — I95.9 HYPOTENSION, UNSPECIFIED: ICD-10-CM

## 2020-08-10 NOTE — CONSULT NOTE ADULT - CONSULT REQUESTED DATE/TIME
Verified pt by name and date of birth. Allergy injection given. Pt signed out AMA without staying for observation.      MICKEY ROJAS LPN    
06-Aug-2019 03:26